# Patient Record
Sex: FEMALE | Race: WHITE
[De-identification: names, ages, dates, MRNs, and addresses within clinical notes are randomized per-mention and may not be internally consistent; named-entity substitution may affect disease eponyms.]

---

## 2021-01-06 ENCOUNTER — HOSPITAL ENCOUNTER (EMERGENCY)
Dept: HOSPITAL 63 - ER | Age: 56
Discharge: HOME | End: 2021-01-06
Payer: OTHER GOVERNMENT

## 2021-01-06 VITALS — WEIGHT: 170.2 LBS | HEIGHT: 67 IN | BODY MASS INDEX: 26.71 KG/M2

## 2021-01-06 VITALS — DIASTOLIC BLOOD PRESSURE: 65 MMHG | SYSTOLIC BLOOD PRESSURE: 149 MMHG

## 2021-01-06 DIAGNOSIS — E11.9: ICD-10-CM
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
## 2021-01-06 DIAGNOSIS — E78.00: ICD-10-CM

## 2021-01-06 DIAGNOSIS — Z88.1: ICD-10-CM

## 2021-01-06 DIAGNOSIS — Z04.3: Primary | ICD-10-CM

## 2021-01-06 DIAGNOSIS — Z86.73: ICD-10-CM

## 2021-01-06 DIAGNOSIS — I25.810: ICD-10-CM
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
## 2021-01-06 DIAGNOSIS — I11.9: ICD-10-CM

## 2021-01-06 DIAGNOSIS — Z88.0: ICD-10-CM
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
## 2021-01-06 DIAGNOSIS — F41.9: ICD-10-CM
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
## 2021-01-06 LAB
ANION GAP SERPL CALC-SCNC: 10 MMOL/L (ref 6–14)
CA-I SERPL ISE-MCNC: 20 MG/DL (ref 7–20)
CALCIUM SERPL-MCNC: 9.2 MG/DL (ref 8.5–10.1)
CHLORIDE SERPL-SCNC: 103 MMOL/L (ref 98–107)
CO2 SERPL-SCNC: 22 MMOL/L (ref 21–32)
CREAT SERPL-MCNC: 1.1 MG/DL (ref 0.6–1)
GFR SERPLBLD BASED ON 1.73 SQ M-ARVRAT: 52 ML/MIN
GLUCOSE SERPL-MCNC: 373 MG/DL (ref 70–99)
POTASSIUM SERPL-SCNC: 3.3 MMOL/L (ref 3.5–5.1)
SODIUM SERPL-SCNC: 135 MMOL/L (ref 136–145)

## 2021-01-06 PROCEDURE — 93005 ELECTROCARDIOGRAM TRACING: CPT

## 2021-01-06 PROCEDURE — 72170 X-RAY EXAM OF PELVIS: CPT

## 2021-01-06 PROCEDURE — 84484 ASSAY OF TROPONIN QUANT: CPT

## 2021-01-06 PROCEDURE — 36415 COLL VENOUS BLD VENIPUNCTURE: CPT

## 2021-01-06 PROCEDURE — 80048 BASIC METABOLIC PNL TOTAL CA: CPT

## 2021-01-06 PROCEDURE — 71045 X-RAY EXAM CHEST 1 VIEW: CPT
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
## 2021-01-06 NOTE — RAD
EXAM: Pelvis, single view.



HISTORY: Fall.



COMPARISON: None.



FINDINGS: A frontal view the pelvis is obtained. There is no fracture, dislocation or subluxation. Th
e femoral heads are normal in configuration.



IMPRESSION: No acute osseous finding.



Electronically signed by: Marcella Melendez MD (1/6/2021 1:31 PM) [id number]

## 2021-01-06 NOTE — EKG
[facility] [address], [place] [address]

Test Date:    2021               Test Time:    12:08:52

Pat Name:     jose elias browne             Department:   

Patient ID:   [id number]           Room:          

Gender:       F                        Technician:   LEONARDO

:          1965               Requested By: JOB SANDERS

Order Number: [id number]            Reading MD:     

                                 Measurements

Intervals                              Axis          

Rate:         86                       P:            34

MT:           172                      QRS:          -23

QRSD:         98                       T:            140

QT:           418                                    

QTc:          504                                    

                           Interpretive Statements

SINUS RHYTHM

LEFTWARD AXIS

LVH WITH REPOLARIZATION ABNORMALITY

PROLONGED QT

ABNORMAL ECG

RI6.02

No previous ECG available for comparison

## 2021-01-06 NOTE — RAD
XR CHEST 1V



History: Reason: FALL / Spl. Instructions:  / History: . Pain



Comparison: None.



Findings:

Low lung volumes. Patchy bibasilar opacities. No pleural effusion. No pneumothorax. Prior median ster
notomy.



Impression: 

1.  Low lung volumes with patchy bibasilar opacities, likely atelectasis.



Electronically signed by: Beka Tubsb DO (1/6/2021 1:31 PM) [id number]

## 2021-03-02 ENCOUNTER — HOSPITAL ENCOUNTER (EMERGENCY)
Dept: HOSPITAL 63 - ER | Age: 56
Discharge: ANOTHER HOSPITAL | End: 2021-03-02
Payer: OTHER GOVERNMENT

## 2021-03-02 VITALS
SYSTOLIC BLOOD PRESSURE: 186 MMHG | DIASTOLIC BLOOD PRESSURE: 95 MMHG | SYSTOLIC BLOOD PRESSURE: 186 MMHG | DIASTOLIC BLOOD PRESSURE: 95 MMHG

## 2021-03-02 VITALS — BODY MASS INDEX: 39.86 KG/M2 | HEIGHT: 67 IN | WEIGHT: 253.97 LBS

## 2021-03-02 DIAGNOSIS — I50.9: ICD-10-CM

## 2021-03-02 DIAGNOSIS — Z86.73: ICD-10-CM

## 2021-03-02 DIAGNOSIS — Z88.1: ICD-10-CM

## 2021-03-02 DIAGNOSIS — E11.9: ICD-10-CM

## 2021-03-02 DIAGNOSIS — Z88.0: ICD-10-CM

## 2021-03-02 DIAGNOSIS — I25.810: ICD-10-CM

## 2021-03-02 DIAGNOSIS — F41.9: ICD-10-CM

## 2021-03-02 DIAGNOSIS — R41.82: Primary | ICD-10-CM

## 2021-03-02 DIAGNOSIS — E87.6: ICD-10-CM

## 2021-03-02 DIAGNOSIS — E11.65: ICD-10-CM

## 2021-03-02 DIAGNOSIS — I11.0: ICD-10-CM

## 2021-03-02 DIAGNOSIS — E78.00: ICD-10-CM

## 2021-03-02 LAB
ALBUMIN SERPL-MCNC: 1.5 G/DL (ref 3.4–5)
ALBUMIN/GLOB SERPL: 0.4 {RATIO} (ref 1–1.7)
ALP SERPL-CCNC: 158 U/L (ref 46–116)
ALT SERPL-CCNC: 13 U/L (ref 14–59)
ANION GAP SERPL CALC-SCNC: 10 MMOL/L (ref 6–14)
APTT PPP: COLORLESS S
AST SERPL-CCNC: 12 U/L (ref 15–37)
BACTERIA #/AREA URNS HPF: 0 /HPF
BASOPHILS # BLD AUTO: 0 X10^3/UL (ref 0–0.2)
BASOPHILS NFR BLD: 0 % (ref 0–3)
BILIRUB SERPL-MCNC: 0.2 MG/DL (ref 0.2–1)
BILIRUB UR QL STRIP: (no result)
BUN/CREAT SERPL: 12 (ref 6–20)
CA-I SERPL ISE-MCNC: 7 MG/DL (ref 7–20)
CALCIUM SERPL-MCNC: 6.7 MG/DL (ref 8.5–10.1)
CHLORIDE SERPL-SCNC: 109 MMOL/L (ref 98–107)
CO2 SERPL-SCNC: 23 MMOL/L (ref 21–32)
CREAT SERPL-MCNC: 0.6 MG/DL (ref 0.6–1)
EOSINOPHIL NFR BLD: 0.3 X10^3/UL (ref 0–0.7)
EOSINOPHIL NFR BLD: 3 % (ref 0–3)
ERYTHROCYTE [DISTWIDTH] IN BLOOD BY AUTOMATED COUNT: 16.3 % (ref 11.5–14.5)
FIBRINOGEN PPP-MCNC: CLEAR MG/DL
GFR SERPLBLD BASED ON 1.73 SQ M-ARVRAT: 104.6 ML/MIN
GLOBULIN SER-MCNC: 3.5 G/DL (ref 2.2–3.8)
GLUCOSE SERPL-MCNC: 456 MG/DL (ref 70–99)
GLUCOSE UR STRIP-MCNC: 500 MG/DL
HCT VFR BLD CALC: 38.4 % (ref 36–47)
HGB BLD-MCNC: 12.8 G/DL (ref 12–15.5)
LYMPHOCYTES # BLD: 1.8 X10^3/UL (ref 1–4.8)
LYMPHOCYTES NFR BLD AUTO: 18 % (ref 24–48)
MAGNESIUM SERPL-MCNC: 1.6 MG/DL (ref 1.8–2.4)
MCH RBC QN AUTO: 27 PG (ref 25–35)
MCHC RBC AUTO-ENTMCNC: 33 G/DL (ref 31–37)
MCV RBC AUTO: 81 FL (ref 79–100)
MONO #: 0.5 X10^3/UL (ref 0–1.1)
MONOCYTES NFR BLD: 5 % (ref 0–9)
NEUT #: 7.3 X10^3UL (ref 1.8–7.7)
NEUTROPHILS NFR BLD AUTO: 74 % (ref 31–73)
NITRITE UR QL STRIP: (no result)
PLATELET # BLD AUTO: 325 X10^3/UL (ref 140–400)
POTASSIUM SERPL-SCNC: 2.8 MMOL/L (ref 3.5–5.1)
PROT SERPL-MCNC: 5 G/DL (ref 6.4–8.2)
RBC # BLD AUTO: 4.77 X10^6/UL (ref 3.5–5.4)
RBC #/AREA URNS HPF: 0 /HPF (ref 0–2)
SODIUM SERPL-SCNC: 142 MMOL/L (ref 136–145)
SP GR UR STRIP: 1.02
SQUAMOUS #/AREA URNS LPF: (no result) /LPF
UROBILINOGEN UR-MCNC: 0.2 MG/DL
WBC # BLD AUTO: 9.9 X10^3/UL (ref 4–11)
WBC #/AREA URNS HPF: 0 /HPF (ref 0–4)

## 2021-03-02 PROCEDURE — 84484 ASSAY OF TROPONIN QUANT: CPT

## 2021-03-02 PROCEDURE — 36415 COLL VENOUS BLD VENIPUNCTURE: CPT

## 2021-03-02 PROCEDURE — 96366 THER/PROPH/DIAG IV INF ADDON: CPT

## 2021-03-02 PROCEDURE — 81001 URINALYSIS AUTO W/SCOPE: CPT

## 2021-03-02 PROCEDURE — 71045 X-RAY EXAM CHEST 1 VIEW: CPT

## 2021-03-02 PROCEDURE — 83735 ASSAY OF MAGNESIUM: CPT

## 2021-03-02 PROCEDURE — 51702 INSERT TEMP BLADDER CATH: CPT

## 2021-03-02 PROCEDURE — 83880 ASSAY OF NATRIURETIC PEPTIDE: CPT

## 2021-03-02 PROCEDURE — 96375 TX/PRO/DX INJ NEW DRUG ADDON: CPT

## 2021-03-02 PROCEDURE — 99285 EMERGENCY DEPT VISIT HI MDM: CPT

## 2021-03-02 PROCEDURE — 82803 BLOOD GASES ANY COMBINATION: CPT

## 2021-03-02 PROCEDURE — 70450 CT HEAD/BRAIN W/O DYE: CPT

## 2021-03-02 PROCEDURE — 80053 COMPREHEN METABOLIC PANEL: CPT

## 2021-03-02 PROCEDURE — 96365 THER/PROPH/DIAG IV INF INIT: CPT

## 2021-03-02 PROCEDURE — 74177 CT ABD & PELVIS W/CONTRAST: CPT

## 2021-03-02 PROCEDURE — 93005 ELECTROCARDIOGRAM TRACING: CPT

## 2021-03-02 PROCEDURE — 83605 ASSAY OF LACTIC ACID: CPT

## 2021-03-02 PROCEDURE — 85025 COMPLETE CBC W/AUTO DIFF WBC: CPT

## 2021-03-02 PROCEDURE — 82947 ASSAY GLUCOSE BLOOD QUANT: CPT

## 2021-03-02 RX ADMIN — POTASSIUM CHLORIDE SCH MLS/HR: 200 INJECTION, SOLUTION INTRAVENOUS at 16:37

## 2021-03-02 RX ADMIN — POTASSIUM CHLORIDE SCH MLS/HR: 200 INJECTION, SOLUTION INTRAVENOUS at 14:48
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
## 2021-03-02 NOTE — EKG
[facility] [address], [place] [address]

Test Date:    2021               Test Time:    12:42:26

Pat Name:     BILL APARICIO             Department:   

Patient ID:   [id number]           Room:          

Gender:       F                        Technician:   LEONARDO

:          1965               Requested By: HARLEY VILLANUEVA

Order Number: [id number]            Reading MD:     

                                 Measurements

Intervals                              Axis          

Rate:         77                       P:            232

NJ:           112                      QRS:          -26

QRSD:         96                       T:            138

QT:           380                                    

QTc:          432                                    

                           Interpretive Statements

SINUS RHYTHM

LEFTWARD AXIS

LVH WITH REPOLARIZATION ABNORMALITY

ABNORMAL ECG

RI6.02

No previous ECG available for comparison

## 2021-03-02 NOTE — RAD
INDICATION: Reason: perineal infection / Spl. Instructions:  / History: .  



COMPARISON: None.



TECHNIQUE:



Axial CT images obtained through the abdomen and pelvis with contrast.



One or more of the following individualized dose reduction techniques were utilized for this examinat
ion:  1. Automated exposure control;  2. Adjustment of the mA and/or kV according to patient size;  3
. Use of iterative reconstruction technique.



FINDINGS:



Postoperative changes to the sternum status post sternotomy. Partial visualization of coronary artery
 calcific atherosclerosis.

Atherosclerotic disease throughout the abdominal aorta without aneurysmal dilatation.

Fat-containing left greater than right inguinal hernia.

Prominent lymph nodes within the groin bilaterally.

Regional low density adjacent to falciform ligament at the liver which is commonly from focal fat.

Layering high density material in the gallbladder which could be from stones and sludge.

No peripancreatic fluid collection.

Prominent lymph nodes at the upper abdomen including portacaval.

Lobulation of the spleen. Calcified granuloma within.

Subcentimeter suspected fat-containing left renal lesion which could be from causes such as angiomyol
ipoma.

Urinary bladder is partially distended.

No hydronephrosis.

Fat-containing umbilical hernia.

Uterus is visualized.

No periappendiceal inflammatory changes.

Small amount haziness to the fact adjacent to the rectal and anal region bilaterally within the perin
eum. No drainable fluid collection is seen at this time with some prominence the rectal wall but not 
very distended.

No dilated loops of bowel to suggest obstruction.

Degenerative changes of the spine.



IMPRESSION:



*  No evidence of bowel obstruction or appendicitis.



*  Mild haziness to the fat adjacent to the anorectal region. Nonspecific in nature but would correla
te with symptoms given that causes such as proctitis could have this appearance.



Electronically signed by: Jesus Lin MD (3/2/2021 2:52 PM) [id number]

## 2021-03-02 NOTE — RAD
INDICATION: Reason: ALTERED MENTAL STATUS / Spl. Instructions:  / History: 



COMPARISON: January 6, 2021



FINDINGS:



Single view of chest obtained.

Cardiomediastinal silhouette is enlarged with poststernotomy changes and surgical clips at the medias
tinum.

Hypoexpanded exam without a definite new region of consolidation.





IMPRESSION:



*  Similar exam compared to prior without a new region of consolidation.



Electronically signed by: Jesus Lin MD (3/2/2021 1:12 PM) [id number]

## 2021-03-02 NOTE — RAD
EXAM: CT Head without IV contrast



INDICATION: Reason: stroke / Spl. Instructions:  / History: 



TECHNIQUE: Multi-detector row CT images were obtained of the head without the use of IV contrast. All
 CT scans performed at this facility utilize dose optimization techniques as appropriate to the exam,
 including the following: Automated exposure control and adjustment of the mA and/or KV according to 
patient size (this includes techniques or standardized protocols for targeted exams where dose is ind
ication/reason for exam).



COMPARISON: None



FINDINGS: 



BRAIN PARENCHYMA: No evidence of acute intraparenchymal hemorrhage or infarct. Generalized parenchyma
l volume loss and white matter low density compatible with chronic ischemic microvascular change. Enc
ephalomalacia in the paramedian superior right frontal lobe, compatible with a chronic JAKOB territory 
infarct.



VENTRICLES & EXTRA-AXIAL SPACES:  Ventricles are within normal limits. Basilar cisterns are patent. N
o pathologic extra-axial fluid collection or mass.



ORBITS: Orbital contents are unremarkable.



SINUSES:  Visualized paranasal sinuses and mastoid air cells are clear.



OSSEOUS & SOFT TISSUES:  Calvarium and skull base are intact.



IMPRESSION:



No acute intracranial pathology.





**********FOR INTERNAL CODING PURPOSES**********



Critical result:



Findings discussed with  Dr. Meghann Engel at 3/2/2021 12:43 PM.



RESULT CODE: (C)  



  







Electronically signed by: Abby Lizarraga MD (3/2/2021 12:45 PM) [id number]

## 2021-06-29 ENCOUNTER — HOSPITAL ENCOUNTER (OUTPATIENT)
Dept: HOSPITAL 63 - US | Age: 56
End: 2021-06-29
Attending: FAMILY MEDICINE
Payer: OTHER GOVERNMENT

## 2021-06-29 DIAGNOSIS — K76.0: Primary | ICD-10-CM

## 2021-06-29 DIAGNOSIS — R74.8: ICD-10-CM

## 2021-06-29 DIAGNOSIS — R16.0: ICD-10-CM

## 2021-06-29 PROCEDURE — 76705 ECHO EXAM OF ABDOMEN: CPT

## 2021-06-29 NOTE — RAD
EXAM: RIGHT UPPER QUADRANT ULTRASOUND.



HISTORY: Elevated liver enzymes.



COMPARISON: None.



FINDINGS: Sonographic evaluation of the right upper quadrant was performed.



Hyperechogenicity of the hepatic parenchyma is consistent with diffuse hepatic steatosis. The liver i
s at least mildly enlarged spanning 18.5 cm. There are no focal lesions. 



The gallbladder is unremarkable without evidence of stones, wall thickening or pericholecystic fluid.
  There is no sonographic Sin sign. The common duct measures 4 mm. Limited images of the visualize
d portions of the head of the pancreas reveal no abnormality.



The right kidney measures 11.0 cm. Cortical thickness and echogenicity are preserved. There is no hyd
ronephrosis.



The visualized portions of the abdominal aorta and inferior vena cava are grossly patent and normal i
n caliber.



IMPRESSION:

1. At least mild diffuse hepatic steatosis and hepatomegaly.



Electronically signed by: MICHAEL Chadwick MD (6/29/2021 11:39 AM) [id number]

## 2021-12-08 ENCOUNTER — HOSPITAL ENCOUNTER (EMERGENCY)
Dept: HOSPITAL 63 - ER | Age: 56
Discharge: HOME | End: 2021-12-08
Payer: OTHER GOVERNMENT

## 2021-12-08 VITALS — BODY MASS INDEX: 44.53 KG/M2 | HEIGHT: 63 IN | WEIGHT: 251.33 LBS

## 2021-12-08 VITALS — SYSTOLIC BLOOD PRESSURE: 160 MMHG | DIASTOLIC BLOOD PRESSURE: 106 MMHG

## 2021-12-08 DIAGNOSIS — F41.9: ICD-10-CM

## 2021-12-08 DIAGNOSIS — I25.810: ICD-10-CM

## 2021-12-08 DIAGNOSIS — Z86.73: ICD-10-CM

## 2021-12-08 DIAGNOSIS — E11.9: ICD-10-CM

## 2021-12-08 DIAGNOSIS — I11.0: Primary | ICD-10-CM

## 2021-12-08 DIAGNOSIS — Z88.1: ICD-10-CM

## 2021-12-08 DIAGNOSIS — F32.9: ICD-10-CM

## 2021-12-08 DIAGNOSIS — E78.00: ICD-10-CM

## 2021-12-08 DIAGNOSIS — I50.9: ICD-10-CM

## 2021-12-08 DIAGNOSIS — Z88.0: ICD-10-CM

## 2021-12-08 LAB
ALBUMIN SERPL-MCNC: 2.3 G/DL (ref 3.4–5)
ALBUMIN/GLOB SERPL: 0.5 {RATIO} (ref 1–1.7)
ALP SERPL-CCNC: 283 U/L (ref 46–116)
ALT SERPL-CCNC: 28 U/L (ref 14–59)
ANION GAP SERPL CALC-SCNC: 11 MMOL/L (ref 6–14)
AST SERPL-CCNC: 39 U/L (ref 15–37)
BASOPHILS # BLD AUTO: 0.1 X10^3/UL (ref 0–0.2)
BASOPHILS NFR BLD: 1 % (ref 0–3)
BILIRUB SERPL-MCNC: 0.4 MG/DL (ref 0.2–1)
BUN/CREAT SERPL: 18 (ref 6–20)
CA-I SERPL ISE-MCNC: 24 MG/DL (ref 7–20)
CALCIUM SERPL-MCNC: 9 MG/DL (ref 8.5–10.1)
CHLORIDE SERPL-SCNC: 109 MMOL/L (ref 98–107)
CO2 SERPL-SCNC: 21 MMOL/L (ref 21–32)
CREAT SERPL-MCNC: 1.3 MG/DL (ref 0.6–1)
EOSINOPHIL NFR BLD: 0.2 X10^3/UL (ref 0–0.7)
EOSINOPHIL NFR BLD: 2 % (ref 0–3)
ERYTHROCYTE [DISTWIDTH] IN BLOOD BY AUTOMATED COUNT: 13.5 % (ref 11.5–14.5)
GFR SERPLBLD BASED ON 1.73 SQ M-ARVRAT: 42.7 ML/MIN
GLOBULIN SER-MCNC: 5 G/DL (ref 2.2–3.8)
GLUCOSE SERPL-MCNC: 167 MG/DL (ref 70–99)
HCT VFR BLD CALC: 37.1 % (ref 36–47)
HGB BLD-MCNC: 12.7 G/DL (ref 12–15.5)
LYMPHOCYTES # BLD: 2.4 X10^3/UL (ref 1–4.8)
LYMPHOCYTES NFR BLD AUTO: 19 % (ref 24–48)
MAGNESIUM SERPL-MCNC: 2.5 MG/DL (ref 1.8–2.4)
MCH RBC QN AUTO: 30 PG (ref 25–35)
MCHC RBC AUTO-ENTMCNC: 34 G/DL (ref 31–37)
MCV RBC AUTO: 86 FL (ref 79–100)
MONO #: 0.8 X10^3/UL (ref 0–1.1)
MONOCYTES NFR BLD: 6 % (ref 0–9)
NEUT #: 9.3 X10^3UL (ref 1.8–7.7)
NEUTROPHILS NFR BLD AUTO: 72 % (ref 31–73)
PLATELET # BLD AUTO: 389 X10^3/UL (ref 140–400)
POTASSIUM SERPL-SCNC: 4.4 MMOL/L (ref 3.5–5.1)
PROT SERPL-MCNC: 7.3 G/DL (ref 6.4–8.2)
RBC # BLD AUTO: 4.29 X10^6/UL (ref 3.5–5.4)
SODIUM SERPL-SCNC: 141 MMOL/L (ref 136–145)
WBC # BLD AUTO: 12.8 X10^3/UL (ref 4–11)

## 2021-12-08 PROCEDURE — 36415 COLL VENOUS BLD VENIPUNCTURE: CPT

## 2021-12-08 PROCEDURE — 84484 ASSAY OF TROPONIN QUANT: CPT

## 2021-12-08 PROCEDURE — 83735 ASSAY OF MAGNESIUM: CPT

## 2021-12-08 PROCEDURE — 99284 EMERGENCY DEPT VISIT MOD MDM: CPT

## 2021-12-08 PROCEDURE — 85025 COMPLETE CBC W/AUTO DIFF WBC: CPT

## 2021-12-08 PROCEDURE — 80053 COMPREHEN METABOLIC PANEL: CPT
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
## 2022-01-22 ENCOUNTER — HOSPITAL ENCOUNTER (EMERGENCY)
Dept: HOSPITAL 63 - ER | Age: 57
Discharge: HOME | End: 2022-01-22
Payer: OTHER GOVERNMENT

## 2022-01-22 VITALS — BODY MASS INDEX: 45.55 KG/M2 | WEIGHT: 257.06 LBS | HEIGHT: 63 IN

## 2022-01-22 VITALS — DIASTOLIC BLOOD PRESSURE: 97 MMHG | SYSTOLIC BLOOD PRESSURE: 187 MMHG

## 2022-01-22 DIAGNOSIS — R53.1: Primary | ICD-10-CM

## 2022-01-22 DIAGNOSIS — R79.89: ICD-10-CM

## 2022-01-22 DIAGNOSIS — E11.9: ICD-10-CM

## 2022-01-22 DIAGNOSIS — Z20.822: ICD-10-CM

## 2022-01-22 DIAGNOSIS — Z86.73: ICD-10-CM

## 2022-01-22 DIAGNOSIS — E78.5: ICD-10-CM

## 2022-01-22 DIAGNOSIS — I50.9: ICD-10-CM

## 2022-01-22 DIAGNOSIS — I11.0: ICD-10-CM

## 2022-01-22 DIAGNOSIS — Z88.1: ICD-10-CM

## 2022-01-22 DIAGNOSIS — Z88.0: ICD-10-CM

## 2022-01-22 LAB
ALBUMIN SERPL-MCNC: 2.3 G/DL (ref 3.4–5)
ALBUMIN/GLOB SERPL: 0.6 {RATIO} (ref 1–1.7)
ALP SERPL-CCNC: 182 U/L (ref 46–116)
ALT SERPL-CCNC: 16 U/L (ref 14–59)
ANION GAP SERPL CALC-SCNC: 13 MMOL/L (ref 6–14)
APTT PPP: YELLOW S
AST SERPL-CCNC: 16 U/L (ref 15–37)
BACTERIA #/AREA URNS HPF: (no result) /HPF
BASOPHILS # BLD AUTO: 0.2 X10^3/UL (ref 0–0.2)
BASOPHILS NFR BLD: 1 % (ref 0–3)
BILIRUB SERPL-MCNC: 0.3 MG/DL (ref 0.2–1)
BILIRUB UR QL STRIP: (no result)
BUN/CREAT SERPL: 11 (ref 6–20)
CA-I SERPL ISE-MCNC: 17 MG/DL (ref 7–20)
CALCIUM SERPL-MCNC: 8.4 MG/DL (ref 8.5–10.1)
CHLORIDE SERPL-SCNC: 111 MMOL/L (ref 98–107)
CO2 SERPL-SCNC: 19 MMOL/L (ref 21–32)
CREAT SERPL-MCNC: 1.6 MG/DL (ref 0.6–1)
EOSINOPHIL NFR BLD: 0.4 X10^3/UL (ref 0–0.7)
EOSINOPHIL NFR BLD: 3 % (ref 0–3)
ERYTHROCYTE [DISTWIDTH] IN BLOOD BY AUTOMATED COUNT: 13.9 % (ref 11.5–14.5)
FIBRINOGEN PPP-MCNC: (no result) MG/DL
GFR SERPLBLD BASED ON 1.73 SQ M-ARVRAT: 33.6 ML/MIN
GLOBULIN SER-MCNC: 3.6 G/DL (ref 2.2–3.8)
GLUCOSE SERPL-MCNC: 119 MG/DL (ref 70–99)
GLUCOSE UR STRIP-MCNC: 250 MG/DL
HCT VFR BLD CALC: 36.7 % (ref 36–47)
HGB BLD-MCNC: 12.1 G/DL (ref 12–15.5)
INFLUENZA A PATIENT: NEGATIVE
INFLUENZA B PATIENT: NEGATIVE
LYMPHOCYTES # BLD: 2.4 X10^3/UL (ref 1–4.8)
LYMPHOCYTES NFR BLD AUTO: 19 % (ref 24–48)
MAGNESIUM SERPL-MCNC: 2 MG/DL (ref 1.8–2.4)
MCH RBC QN AUTO: 28 PG (ref 25–35)
MCHC RBC AUTO-ENTMCNC: 33 G/DL (ref 31–37)
MCV RBC AUTO: 85 FL (ref 79–100)
MONO #: 0.6 X10^3/UL (ref 0–1.1)
MONOCYTES NFR BLD: 5 % (ref 0–9)
NEUT #: 9.1 X10^3UL (ref 1.8–7.7)
NEUTROPHILS NFR BLD AUTO: 72 % (ref 31–73)
NITRITE UR QL STRIP: (no result)
PLATELET # BLD AUTO: 344 X10^3/UL (ref 140–400)
POTASSIUM SERPL-SCNC: 3.5 MMOL/L (ref 3.5–5.1)
PROT SERPL-MCNC: 5.9 G/DL (ref 6.4–8.2)
RBC # BLD AUTO: 4.31 X10^6/UL (ref 3.5–5.4)
RBC #/AREA URNS HPF: (no result) /HPF (ref 0–2)
SODIUM SERPL-SCNC: 143 MMOL/L (ref 136–145)
SP GR UR STRIP: 1.02
SQUAMOUS #/AREA URNS LPF: (no result) /LPF
UROBILINOGEN UR-MCNC: 0.2 MG/DL
WBC # BLD AUTO: 12.7 X10^3/UL (ref 4–11)
WBC #/AREA URNS HPF: 0 /HPF (ref 0–4)

## 2022-01-22 PROCEDURE — 80053 COMPREHEN METABOLIC PANEL: CPT

## 2022-01-22 PROCEDURE — 85610 PROTHROMBIN TIME: CPT

## 2022-01-22 PROCEDURE — 99285 EMERGENCY DEPT VISIT HI MDM: CPT

## 2022-01-22 PROCEDURE — 96374 THER/PROPH/DIAG INJ IV PUSH: CPT

## 2022-01-22 PROCEDURE — 87428 SARSCOV & INF VIR A&B AG IA: CPT

## 2022-01-22 PROCEDURE — 83735 ASSAY OF MAGNESIUM: CPT

## 2022-01-22 PROCEDURE — 83605 ASSAY OF LACTIC ACID: CPT

## 2022-01-22 PROCEDURE — 81001 URINALYSIS AUTO W/SCOPE: CPT

## 2022-01-22 PROCEDURE — C9803 HOPD COVID-19 SPEC COLLECT: HCPCS

## 2022-01-22 PROCEDURE — 84484 ASSAY OF TROPONIN QUANT: CPT

## 2022-01-22 PROCEDURE — 36415 COLL VENOUS BLD VENIPUNCTURE: CPT

## 2022-01-22 PROCEDURE — 71045 X-RAY EXAM CHEST 1 VIEW: CPT

## 2022-01-22 PROCEDURE — 96375 TX/PRO/DX INJ NEW DRUG ADDON: CPT

## 2022-01-22 PROCEDURE — 93005 ELECTROCARDIOGRAM TRACING: CPT

## 2022-01-22 PROCEDURE — 83880 ASSAY OF NATRIURETIC PEPTIDE: CPT

## 2022-01-22 PROCEDURE — 70450 CT HEAD/BRAIN W/O DYE: CPT

## 2022-01-22 PROCEDURE — 85730 THROMBOPLASTIN TIME PARTIAL: CPT

## 2022-01-22 PROCEDURE — 85025 COMPLETE CBC W/AUTO DIFF WBC: CPT

## 2022-01-22 PROCEDURE — U0003 INFECTIOUS AGENT DETECTION BY NUCLEIC ACID (DNA OR RNA); SEVERE ACUTE RESPIRATORY SYNDROME CORONAVIRUS 2 (SARS-COV-2) (CORONAVIRUS DISEASE [COVID-19]), AMPLIFIED PROBE TECHNIQUE, MAKING USE OF HIGH THROUGHPUT TECHNOLOGIES AS DESCRIBED BY CMS-2020-01-R: HCPCS

## 2022-01-22 PROCEDURE — 82553 CREATINE MB FRACTION: CPT

## 2022-01-22 NOTE — RAD
Single view chest dated 1/22/2022 3:31 AM:



COMPARISON: 3/2/2021



Clinical Indication: Chest wall discomfort.



Findings:



Single upright portable exam of the chest was performed. Heart and mediastinal contours are stable. P
atient is status post median sternotomy. Lungs are clear. No consolidation or pleural effusion. No pn
eumothorax. Coarsened perihilar linear markings, unchanged.



IMPRESSION:



No acute radiographic abnormality. Stable findings compared to 3/2/2021.



Electronically signed by: Iam Smith MD (1/22/2022 3:32 AM) DE[facility]

## 2022-01-22 NOTE — RAD
CT head without contrast dated 1/22/2022 6:42 AM



Comparison: 3/2/2021.



CLINICAL INDICATION: Slurred speech



TECHNIQUE:



Contiguous axial imaging of the head was performed from skull base to vertex. 

One or more of the following individualized dose reduction techniques were utilized for this examinat
ion:  

1. Automated exposure control  

2. Adjustment of the mA and/or kV according to patient size  

3. Use of iterative reconstruction technique.



FINDINGS:



Ventricles and sulci are mildly prominent for age. No midline shift or mass effect. Mild patchy low d
ensity in the deep/subcortical periventricular white matter. Small remote appearing infarct of the pa
rasagittal right frontal lobe near the vertex. There is also a tiny remote appearing infarct of the p
arasagittal right parietal lobe near the vertex. No hemorrhage or extra-axial collection. Posterior f
bessie and brainstem unremarkable.



Visualized paranasal sinuses and mastoid air cells are clear. No apparent calvarial abnormality.



IMPRESSION:

1. No evidence of acute intracranial hemorrhage or mass.

2. Mild chronic small vessel ischemic changes and atrophy. There are small remote cortical infarcts o
f the parasagittal right frontal and right parietal lobe, unchanged.



Electronically signed by: Iam Smith MD (1/22/2022 6:43 AM) [facility]
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
## 2022-03-29 ENCOUNTER — HOSPITAL ENCOUNTER (EMERGENCY)
Dept: HOSPITAL 63 - ER | Age: 57
Discharge: HOME | End: 2022-03-29
Payer: OTHER GOVERNMENT

## 2022-03-29 VITALS — BODY MASS INDEX: 45.55 KG/M2 | HEIGHT: 63 IN | WEIGHT: 257.06 LBS

## 2022-03-29 VITALS — DIASTOLIC BLOOD PRESSURE: 90 MMHG | SYSTOLIC BLOOD PRESSURE: 186 MMHG
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
## 2022-03-29 DIAGNOSIS — N18.9: ICD-10-CM

## 2022-03-29 DIAGNOSIS — E78.00: ICD-10-CM

## 2022-03-29 DIAGNOSIS — I50.9: ICD-10-CM

## 2022-03-29 DIAGNOSIS — F41.9: ICD-10-CM

## 2022-03-29 DIAGNOSIS — I13.0: ICD-10-CM

## 2022-03-29 DIAGNOSIS — E11.22: Primary | ICD-10-CM
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
## 2022-03-29 DIAGNOSIS — Z88.1: ICD-10-CM
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
## 2022-03-29 DIAGNOSIS — I25.810: ICD-10-CM

## 2022-03-29 DIAGNOSIS — N39.0: ICD-10-CM

## 2022-03-29 DIAGNOSIS — Z88.0: ICD-10-CM

## 2022-03-29 DIAGNOSIS — Z86.73: ICD-10-CM

## 2022-03-29 LAB
ANION GAP SERPL CALC-SCNC: 14 MMOL/L (ref 6–14)
ANION GAP SERPL CALC-SCNC: 14 MMOL/L (ref 6–14)
APTT PPP: YELLOW S
BACTERIA #/AREA URNS HPF: (no result) /HPF
BASOPHILS # BLD AUTO: 0.1 X10^3/UL (ref 0–0.2)
BASOPHILS NFR BLD: 1 % (ref 0–3)
CA-I SERPL ISE-MCNC: 56 MG/DL (ref 7–20)
CA-I SERPL ISE-MCNC: 57 MG/DL (ref 7–20)
CALCIUM SERPL-MCNC: 10.1 MG/DL (ref 8.5–10.1)
CALCIUM SERPL-MCNC: 9.8 MG/DL (ref 8.5–10.1)
CHLORIDE SERPL-SCNC: 106 MMOL/L (ref 98–107)
CHLORIDE SERPL-SCNC: 108 MMOL/L (ref 98–107)
CO2 SERPL-SCNC: 17 MMOL/L (ref 21–32)
CO2 SERPL-SCNC: 18 MMOL/L (ref 21–32)
CREAT SERPL-MCNC: 2.1 MG/DL (ref 0.6–1)
CREAT SERPL-MCNC: 2.4 MG/DL (ref 0.6–1)
EOSINOPHIL NFR BLD: 0 % (ref 0–3)
EOSINOPHIL NFR BLD: 0 X10^3/UL (ref 0–0.7)
ERYTHROCYTE [DISTWIDTH] IN BLOOD BY AUTOMATED COUNT: 15.5 % (ref 11.5–14.5)
FIBRINOGEN PPP-MCNC: (no result) MG/DL
GFR SERPLBLD BASED ON 1.73 SQ M-ARVRAT: 21 ML/MIN
GFR SERPLBLD BASED ON 1.73 SQ M-ARVRAT: 24.5 ML/MIN
GLUCOSE SERPL-MCNC: 149 MG/DL (ref 70–99)
GLUCOSE SERPL-MCNC: 207 MG/DL (ref 70–99)
GLUCOSE UR STRIP-MCNC: (no result) MG/DL
HCT VFR BLD CALC: 43.3 % (ref 36–47)
HGB BLD-MCNC: 14.4 G/DL (ref 12–15.5)
LYMPHOCYTES # BLD: 1.2 X10^3/UL (ref 1–4.8)
LYMPHOCYTES NFR BLD AUTO: 13 % (ref 24–48)
MAGNESIUM SERPL-MCNC: 2.6 MG/DL (ref 1.8–2.4)
MCH RBC QN AUTO: 30 PG (ref 25–35)
MCHC RBC AUTO-ENTMCNC: 33 G/DL (ref 31–37)
MCV RBC AUTO: 90 FL (ref 79–100)
MONO #: 0.5 X10^3/UL (ref 0–1.1)
MONOCYTES NFR BLD: 5 % (ref 0–9)
NEUT #: 7.8 X10^3UL (ref 1.8–7.7)
NEUTROPHILS NFR BLD AUTO: 81 % (ref 31–73)
NITRITE UR QL STRIP: (no result)
PHOSPHATE SERPL-MCNC: 5.7 MG/DL (ref 2.6–4.7)
PLATELET # BLD AUTO: 294 X10^3/UL (ref 140–400)
POTASSIUM SERPL-SCNC: 4.4 MMOL/L (ref 3.5–5.1)
POTASSIUM SERPL-SCNC: 4.5 MMOL/L (ref 3.5–5.1)
RBC # BLD AUTO: 4.81 X10^6/UL (ref 3.5–5.4)
RBC #/AREA URNS HPF: (no result) /HPF (ref 0–2)
SODIUM SERPL-SCNC: 138 MMOL/L (ref 136–145)
SODIUM SERPL-SCNC: 139 MMOL/L (ref 136–145)
SP GR UR STRIP: >=1.03
SQUAMOUS #/AREA URNS LPF: (no result) /LPF
UROBILINOGEN UR-MCNC: 0.2 MG/DL
WBC # BLD AUTO: 9.6 X10^3/UL (ref 4–11)
WBC #/AREA URNS HPF: (no result) /HPF (ref 0–4)
YEAST #/AREA URNS HPF: PRESENT /HPF

## 2022-03-29 PROCEDURE — 83735 ASSAY OF MAGNESIUM: CPT

## 2022-03-29 PROCEDURE — 80048 BASIC METABOLIC PNL TOTAL CA: CPT

## 2022-03-29 PROCEDURE — 93005 ELECTROCARDIOGRAM TRACING: CPT

## 2022-03-29 PROCEDURE — 81001 URINALYSIS AUTO W/SCOPE: CPT

## 2022-03-29 PROCEDURE — 72125 CT NECK SPINE W/O DYE: CPT

## 2022-03-29 PROCEDURE — 87086 URINE CULTURE/COLONY COUNT: CPT

## 2022-03-29 PROCEDURE — 70450 CT HEAD/BRAIN W/O DYE: CPT

## 2022-03-29 PROCEDURE — 85025 COMPLETE CBC W/AUTO DIFF WBC: CPT

## 2022-03-29 PROCEDURE — 96360 HYDRATION IV INFUSION INIT: CPT

## 2022-03-29 PROCEDURE — 36415 COLL VENOUS BLD VENIPUNCTURE: CPT

## 2022-03-29 PROCEDURE — 82550 ASSAY OF CK (CPK): CPT

## 2022-03-29 PROCEDURE — 71045 X-RAY EXAM CHEST 1 VIEW: CPT

## 2022-03-29 PROCEDURE — 84100 ASSAY OF PHOSPHORUS: CPT

## 2022-03-29 PROCEDURE — 99285 EMERGENCY DEPT VISIT HI MDM: CPT

## 2022-03-29 PROCEDURE — 84484 ASSAY OF TROPONIN QUANT: CPT
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
## 2022-03-29 NOTE — RAD
Single view chest dated 3/29/2022 9:08 AM:



COMPARISON: 1/22/2022



Clinical Indication: Cough. Fall. Weakness



Findings:



Single upright portable exam of the chest was performed. Heart and mediastinal contours are stable. P
atient is status post median sternotomy. Mild elevation of right hemidiaphragm, unchanged. Lungs are 
clear. No pleural effusion or pneumothorax.



IMPRESSION:



No acute radiographic abnormality. Stable findings compared to 1/22/2022.



Electronically signed by: Iam Smith MD (3/29/2022 9:16 AM) [id number]

## 2022-03-29 NOTE — RAD
CT HEAD AND C-SPINE WO 



Date: 3/29/2022 8:47 AM 



Clinical Indication: Reason: fall / Spl. Instructions:  / History: 



Comparison: CT head from 1/22/2022 dating back to 3/2/2021.



Technique:  5 mm axial tomographic images were obtained of the head without contrast. These were view
ed on brain and bone windows. Noncontrast CT of the cervical spine was performed. Sagittal and corona
l reformats were performed and evaluated. One or more of the following dose reduction techniques were
 utilized: Automated exposure control (AEC), Adjustment of mA and/or kV according to patient size, Us
e of iterative reconstruction technique such as ASiR, CT scan done according to ALARA and image gentl
y/image wisely



HEAD FINDINGS:



Similar mild generalized cerebral and cerebellar volume loss. Similar moderate nonspecific periventri
cular hypoattenuation, most commonly seen with chronic small vessel ischemic disease. Similar remote 
appearing infarct of the of the right frontal lobe near the vertex. Additional tiny remote appearing 
infarct of the right parietal lobe also near the vertex.



No intra- or extra-axial mass or fluid collection. No acute hemorrhage. The ventricles are normal in 
size, shape, and morphology. The gray-white matter junction is normal. The basilar cisterns are paten
t. 



Trace mucosal thickening of the left maxillary sinus. Remaining paranasal sinuses are clear..  The vi
sualized portions of the orbits and globes are normal. The mastoid air cells are clear. No aggressive
 osseous lesion or fracture. Soft tissues are grossly unremarkable.



CERVICAL SPINE FINDINGS:

The cervical spine is normally aligned. No acute fracture. No aggressive lytic or blastic osseous les
ions.



Mild multilevel degenerative disc space height loss. Multilevel mild spinal canal stenosis secondary 
to disc protrusions and marginal osteophytes. Multilevel mild neuroforaminal narrowing secondary to u
ncovertebral arthrosis. Multilevel mild facet arthrosis.  



Small subcentimeter hypoattenuating nodules seen in both lobes. No follow-up is indicated.. No cervic
al lymphadenopathy. Bilateral carotid atherosclerosis. The visualized aerodigestive tract is normal. 
 



The visualized portions of the lungs are clear. 



IMPRESSION:

1. No acute intracranial process.

2. No acute cervical spine fracture.

3. Overall similar mild to moderate chronic small vessel ischemic changes and atrophy. Similar appear
ance of small or remote cortical infarcts of the right frontal and right parietal lobes.



Electronically signed by: Rogelio Vanegas DO (3/29/2022 9:06 AM) [id number]

## 2022-03-29 NOTE — EKG
[facility] [address], [place] [address]

Test Date:    2022               Test Time:    08:04:28

Pat Name:     BILL APARICIO             Department:   

Patient ID:   [id number]           Room:          

Gender:       F                        Technician:   

:          1965               Requested By: KAY HENRY

Order Number: [id number]            Reading MD:     

                                 Measurements

Intervals                              Axis          

Rate:         85                       P:            234

GA:           122                      QRS:          -30

QRSD:         98                       T:            79

QT:           408                                    

QTc:          486                                    

                           Interpretive Statements

SUPRAVENTRICULAR RHYTHM

ABNORMAL LEFT AXIS DEVIATION

R-S TRANSITION ZONE IN V LEADS DISPLACED TO THE LEFT

LEFT ANTERIOR FASCICULAR BLOCK

LEFT VENTRICULAR HYPERTROPHY

T ABNORMALITY IN ANTEROSEPTAL LEADS

HIGH LATERAL LEADS

PROLONGED QT

ABNORMAL ECG

RI6.02

No previous ECG available for comparison

## 2022-04-12 ENCOUNTER — HOSPITAL ENCOUNTER (EMERGENCY)
Dept: HOSPITAL 63 - ER | Age: 57
LOS: 1 days | Discharge: ANOTHER HOSPITAL | End: 2022-04-13
Payer: OTHER GOVERNMENT

## 2022-04-12 VITALS — WEIGHT: 257.06 LBS | BODY MASS INDEX: 45.55 KG/M2 | HEIGHT: 63 IN

## 2022-04-12 DIAGNOSIS — N39.0: ICD-10-CM

## 2022-04-12 DIAGNOSIS — R79.89: ICD-10-CM

## 2022-04-12 DIAGNOSIS — F32.9: ICD-10-CM

## 2022-04-12 DIAGNOSIS — I50.9: ICD-10-CM

## 2022-04-12 DIAGNOSIS — R07.89: ICD-10-CM

## 2022-04-12 DIAGNOSIS — N17.9: ICD-10-CM

## 2022-04-12 DIAGNOSIS — N18.9: ICD-10-CM

## 2022-04-12 DIAGNOSIS — I13.0: ICD-10-CM

## 2022-04-12 DIAGNOSIS — Z88.1: ICD-10-CM

## 2022-04-12 DIAGNOSIS — Z20.822: ICD-10-CM

## 2022-04-12 DIAGNOSIS — Z88.0: ICD-10-CM

## 2022-04-12 DIAGNOSIS — Z86.73: ICD-10-CM

## 2022-04-12 DIAGNOSIS — F41.9: ICD-10-CM

## 2022-04-12 DIAGNOSIS — R79.1: ICD-10-CM

## 2022-04-12 DIAGNOSIS — E78.00: ICD-10-CM

## 2022-04-12 DIAGNOSIS — I25.810: ICD-10-CM

## 2022-04-12 DIAGNOSIS — E11.22: Primary | ICD-10-CM

## 2022-04-12 DIAGNOSIS — D72.829: ICD-10-CM

## 2022-04-12 LAB
% LYMPHS: 13 % (ref 24–48)
% MONOS: 10 % (ref 0–10)
% SEGS: 77 % (ref 35–66)
ALBUMIN SERPL-MCNC: 3 G/DL (ref 3.4–5)
ALP SERPL-CCNC: 200 U/L (ref 46–116)
ALT SERPL-CCNC: 14 U/L (ref 14–59)
AMORPH SED URNS QL MICRO: PRESENT /HPF
AMPHETAMINE/METHAMPHETAMINE: (no result)
ANION GAP SERPL CALC-SCNC: 18 MMOL/L (ref 6–14)
ANISOCYTOSIS BLD QL SMEAR: SLIGHT
APTT PPP: YELLOW S
AST SERPL-CCNC: 13 U/L (ref 15–37)
BACTERIA #/AREA URNS HPF: 0 /HPF
BARBITURATES UR-MCNC: (no result) UG/ML
BASOPHILS # BLD AUTO: 0.1 X10^3/UL (ref 0–0.2)
BASOPHILS NFR BLD: 1 % (ref 0–3)
BENZODIAZ UR-MCNC: (no result) UG/L
BILIRUB DIRECT SERPL-MCNC: 0.1 MG/DL (ref 0–0.2)
BILIRUB SERPL-MCNC: 0.5 MG/DL (ref 0.2–1)
CA-I SERPL ISE-MCNC: 101 MG/DL (ref 7–20)
CALCIUM SERPL-MCNC: 9.4 MG/DL (ref 8.5–10.1)
CANNABINOIDS UR-MCNC: (no result) UG/L
CHLORIDE SERPL-SCNC: 108 MMOL/L (ref 98–107)
CO2 SERPL-SCNC: 16 MMOL/L (ref 21–32)
COCAINE UR-MCNC: (no result) NG/ML
CREAT SERPL-MCNC: 4 MG/DL (ref 0.6–1)
EOSINOPHIL NFR BLD: 0 % (ref 0–3)
EOSINOPHIL NFR BLD: 0 X10^3/UL (ref 0–0.7)
ERYTHROCYTE [DISTWIDTH] IN BLOOD BY AUTOMATED COUNT: 15.4 % (ref 11.5–14.5)
FIBRINOGEN PPP-MCNC: (no result) MG/DL
GFR SERPLBLD BASED ON 1.73 SQ M-ARVRAT: 11.7 ML/MIN
GLUCOSE SERPL-MCNC: 161 MG/DL (ref 70–99)
GLUCOSE UR STRIP-MCNC: (no result) MG/DL
HCT VFR BLD CALC: 38.7 % (ref 36–47)
HGB BLD-MCNC: 12.6 G/DL (ref 12–15.5)
HYALINE CASTS #/AREA URNS LPF: (no result) /HPF
LIPASE: 59 U/L (ref 73–393)
LYMPHOCYTES # BLD: 1.3 X10^3/UL (ref 1–4.8)
LYMPHOCYTES NFR BLD AUTO: 8 % (ref 24–48)
MAGNESIUM SERPL-MCNC: 2.7 MG/DL (ref 1.8–2.4)
MCH RBC QN AUTO: 29 PG (ref 25–35)
MCHC RBC AUTO-ENTMCNC: 32 G/DL (ref 31–37)
MCV RBC AUTO: 90 FL (ref 79–100)
METHADONE SERPL-MCNC: (no result) NG/ML
MONO #: 0.9 X10^3/UL (ref 0–1.1)
MONOCYTES NFR BLD: 6 % (ref 0–9)
NEUT #: 13.4 X10^3UL (ref 1.8–7.7)
NEUTROPHILS NFR BLD AUTO: 85 % (ref 31–73)
NITRITE UR QL STRIP: (no result)
OPIATES UR-MCNC: (no result) NG/ML
PCP SERPL-MCNC: (no result) MG/DL
PLATELET # BLD AUTO: 362 X10^3/UL (ref 140–400)
PLATELET # BLD EST: ADEQUATE 10*3/UL
PLATELET CLUMP: PRESENT
POTASSIUM SERPL-SCNC: 4.5 MMOL/L (ref 3.5–5.1)
PROT SERPL-MCNC: 7.5 G/DL (ref 6.4–8.2)
RBC # BLD AUTO: 4.3 X10^6/UL (ref 3.5–5.4)
RBC #/AREA URNS HPF: (no result) /HPF (ref 0–2)
SODIUM SERPL-SCNC: 142 MMOL/L (ref 136–145)
SP GR UR STRIP: 1.02
SQUAMOUS #/AREA URNS LPF: (no result) /LPF
UROBILINOGEN UR-MCNC: 0.2 MG/DL
WBC # BLD AUTO: 15.8 X10^3/UL (ref 4–11)
WBC #/AREA URNS HPF: >40 /HPF (ref 0–4)
YEAST #/AREA URNS HPF: PRESENT /HPF

## 2022-04-12 PROCEDURE — 80048 BASIC METABOLIC PNL TOTAL CA: CPT

## 2022-04-12 PROCEDURE — 85379 FIBRIN DEGRADATION QUANT: CPT

## 2022-04-12 PROCEDURE — 87086 URINE CULTURE/COLONY COUNT: CPT

## 2022-04-12 PROCEDURE — 85610 PROTHROMBIN TIME: CPT

## 2022-04-12 PROCEDURE — 87591 N.GONORRHOEAE DNA AMP PROB: CPT

## 2022-04-12 PROCEDURE — 87428 SARSCOV & INF VIR A&B AG IA: CPT

## 2022-04-12 PROCEDURE — 80076 HEPATIC FUNCTION PANEL: CPT

## 2022-04-12 PROCEDURE — 85730 THROMBOPLASTIN TIME PARTIAL: CPT

## 2022-04-12 PROCEDURE — 87106 FUNGI IDENTIFICATION YEAST: CPT

## 2022-04-12 PROCEDURE — 82550 ASSAY OF CK (CPK): CPT

## 2022-04-12 PROCEDURE — 84484 ASSAY OF TROPONIN QUANT: CPT

## 2022-04-12 PROCEDURE — 99285 EMERGENCY DEPT VISIT HI MDM: CPT

## 2022-04-12 PROCEDURE — 80307 DRUG TEST PRSMV CHEM ANLYZR: CPT

## 2022-04-12 PROCEDURE — 80061 LIPID PANEL: CPT

## 2022-04-12 PROCEDURE — 85007 BL SMEAR W/DIFF WBC COUNT: CPT

## 2022-04-12 PROCEDURE — 96375 TX/PRO/DX INJ NEW DRUG ADDON: CPT

## 2022-04-12 PROCEDURE — 85025 COMPLETE CBC W/AUTO DIFF WBC: CPT

## 2022-04-12 PROCEDURE — 93005 ELECTROCARDIOGRAM TRACING: CPT

## 2022-04-12 PROCEDURE — 96372 THER/PROPH/DIAG INJ SC/IM: CPT

## 2022-04-12 PROCEDURE — 83735 ASSAY OF MAGNESIUM: CPT

## 2022-04-12 PROCEDURE — 81001 URINALYSIS AUTO W/SCOPE: CPT

## 2022-04-12 PROCEDURE — 96366 THER/PROPH/DIAG IV INF ADDON: CPT

## 2022-04-12 PROCEDURE — 87491 CHLMYD TRACH DNA AMP PROBE: CPT

## 2022-04-12 PROCEDURE — 96365 THER/PROPH/DIAG IV INF INIT: CPT

## 2022-04-12 PROCEDURE — 84443 ASSAY THYROID STIM HORMONE: CPT

## 2022-04-12 PROCEDURE — 36415 COLL VENOUS BLD VENIPUNCTURE: CPT

## 2022-04-12 PROCEDURE — 83880 ASSAY OF NATRIURETIC PEPTIDE: CPT

## 2022-04-12 PROCEDURE — 83690 ASSAY OF LIPASE: CPT

## 2022-04-12 PROCEDURE — 71045 X-RAY EXAM CHEST 1 VIEW: CPT
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
## 2022-04-12 NOTE — RAD
AP chest.



HISTORY: Dyspnea



AP view was taken of the chest. Comparison is made with a study from March 29. Patient previous bypas
s. Patient's taken a poor inspiration. There is elevation the right diaphragm which is chronic. There
 are no acute infiltrates. There is no effusion. This been no change.



IMPRESSION:

1. Elevated right diaphragm.

2. No new infiltrates.



Electronically signed by: Jay Jay Núñez MD (4/12/2022 8:47 PM) [facility]

## 2022-04-13 VITALS — DIASTOLIC BLOOD PRESSURE: 72 MMHG | SYSTOLIC BLOOD PRESSURE: 130 MMHG

## 2022-04-13 LAB
CHOLEST/HDLC SERPL: 2.8 {RATIO}
HDLC SERPL-MCNC: 49 MG/DL (ref 40–60)
INFLUENZA A PATIENT: NEGATIVE
INFLUENZA B PATIENT: NEGATIVE
LDLC: 56 MG/DL (ref 0–100)
THYROID STIM HORMONE (TSH): 0.62 UIU/ML (ref 0.36–3.74)
TRIGL SERPL-MCNC: 164 MG/DL (ref 0–150)
VLDLC: 33 MG/DL (ref 0–40)

## 2022-04-13 NOTE — EKG
[facility] [address], [place] [address]

Test Date:    2022               Test Time:    19:03:28

Pat Name:     BILL APARICIO             Department:   

Patient ID:   [id number]           Room:          

Gender:       F                        Technician:   MARIA LUISA

:          1965               Requested By: CORAL WELLS

Order Number: [id number]            Reading MD:   Deion Lopez

                                 Measurements

Intervals                              Axis          

Rate:         87                       P:            98

CO:           166                      QRS:          -20

QRSD:         92                       T:            -19

QT:           414                                    

QTc:          505                                    

                           Interpretive Statements

SINUS RHYTHM

LEFTWARD AXIS

PROLONGED QT

Electronically Signed On 4- 13:38:50 CDT by Deion Lopez